# Patient Record
Sex: MALE | Race: WHITE | NOT HISPANIC OR LATINO | Employment: FULL TIME | URBAN - METROPOLITAN AREA
[De-identification: names, ages, dates, MRNs, and addresses within clinical notes are randomized per-mention and may not be internally consistent; named-entity substitution may affect disease eponyms.]

---

## 2023-06-18 ENCOUNTER — HOSPITAL ENCOUNTER (EMERGENCY)
Facility: OTHER | Age: 48
Discharge: HOME | End: 2023-06-18
Attending: EMERGENCY MEDICINE
Payer: COMMERCIAL

## 2023-06-18 VITALS
BODY MASS INDEX: 30.78 KG/M2 | HEIGHT: 70 IN | SYSTOLIC BLOOD PRESSURE: 130 MMHG | TEMPERATURE: 98 F | RESPIRATION RATE: 18 BRPM | HEART RATE: 76 BPM | WEIGHT: 215 LBS | DIASTOLIC BLOOD PRESSURE: 83 MMHG | OXYGEN SATURATION: 98 %

## 2023-06-18 DIAGNOSIS — T79.2XXA: Primary | ICD-10-CM
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
## 2023-06-18 DIAGNOSIS — R52 PAIN: ICD-10-CM

## 2023-06-18 LAB
ANION GAP SERPL CALC-SCNC: 13 MMOL/L (ref 8–16)
BASOPHILS # BLD AUTO: 0.03 K/UL (ref 0–0.2)
BASOPHILS NFR BLD: 0.4 % (ref 0–1.9)
BUN SERPL-MCNC: 18 MG/DL (ref 6–20)
CALCIUM SERPL-MCNC: 9.5 MG/DL (ref 8.7–10.5)
CHLORIDE SERPL-SCNC: 106 MMOL/L (ref 95–110)
CO2 SERPL-SCNC: 20 MMOL/L (ref 23–29)
CREAT SERPL-MCNC: 0.9 MG/DL (ref 0.5–1.4)
DIFFERENTIAL METHOD: NORMAL
EOSINOPHIL # BLD AUTO: 0.2 K/UL (ref 0–0.5)
EOSINOPHIL NFR BLD: 2.6 % (ref 0–8)
ERYTHROCYTE [DISTWIDTH] IN BLOOD BY AUTOMATED COUNT: 13.7 % (ref 11.5–14.5)
EST. GFR  (NO RACE VARIABLE): >60 ML/MIN/1.73 M^2
GLUCOSE SERPL-MCNC: 99 MG/DL (ref 70–110)
HCT VFR BLD AUTO: 41.7 % (ref 40–54)
HGB BLD-MCNC: 14.6 G/DL (ref 14–18)
IMM GRANULOCYTES # BLD AUTO: 0.03 K/UL (ref 0–0.04)
IMM GRANULOCYTES NFR BLD AUTO: 0.4 % (ref 0–0.5)
LYMPHOCYTES # BLD AUTO: 1.8 K/UL (ref 1–4.8)
LYMPHOCYTES NFR BLD: 24.7 % (ref 18–48)
MCH RBC QN AUTO: 30.4 PG (ref 27–31)
MCHC RBC AUTO-ENTMCNC: 35 G/DL (ref 32–36)
MCV RBC AUTO: 87 FL (ref 82–98)
MONOCYTES # BLD AUTO: 0.6 K/UL (ref 0.3–1)
MONOCYTES NFR BLD: 8.3 % (ref 4–15)
NEUTROPHILS # BLD AUTO: 4.7 K/UL (ref 1.8–7.7)
NEUTROPHILS NFR BLD: 63.6 % (ref 38–73)
NRBC BLD-RTO: 0 /100 WBC
PLATELET # BLD AUTO: 286 K/UL (ref 150–450)
PMV BLD AUTO: 9.3 FL (ref 9.2–12.9)
POTASSIUM SERPL-SCNC: 3.9 MMOL/L (ref 3.5–5.1)
RBC # BLD AUTO: 4.81 M/UL (ref 4.6–6.2)
SODIUM SERPL-SCNC: 139 MMOL/L (ref 136–145)
WBC # BLD AUTO: 7.34 K/UL (ref 3.9–12.7)

## 2023-06-18 PROCEDURE — 99284 EMERGENCY DEPT VISIT MOD MDM: CPT | Mod: 25

## 2023-06-18 PROCEDURE — 80048 BASIC METABOLIC PNL TOTAL CA: CPT | Performed by: NURSE PRACTITIONER

## 2023-06-18 PROCEDURE — 85025 COMPLETE CBC W/AUTO DIFF WBC: CPT | Performed by: NURSE PRACTITIONER

## 2023-06-18 PROCEDURE — 63600175 PHARM REV CODE 636 W HCPCS: Performed by: NURSE PRACTITIONER

## 2023-06-18 RX ORDER — HYDROCODONE BITARTRATE AND ACETAMINOPHEN 5; 325 MG/1; MG/1
1 TABLET ORAL 4 TIMES DAILY
Qty: 12 TABLET | Refills: 0 | Status: SHIPPED | OUTPATIENT
Start: 2023-06-18 | End: 2023-06-21

## 2023-06-18 RX ADMIN — SODIUM CHLORIDE, POTASSIUM CHLORIDE, SODIUM LACTATE AND CALCIUM CHLORIDE 1000 ML: 600; 310; 30; 20 INJECTION, SOLUTION INTRAVENOUS at 04:06

## 2023-06-18 NOTE — ED PROVIDER NOTES
"Source of History:  Patient     Chief complaint:  Hip Pain (Reports falling on steep/sharp stairs 2 weeks ago. Has been having L hip pain since. Reports a large contusion to L hip. Able to ambulate but reports pain. )      HPI:  Chris Aldridge is a 47 y.o. male presenting to the emergency department with complaint of left hip pain/bruising after slip and fall on some stairs 2 weeks ago.  Reports persistent swelling which concerned him and prompted him to present to emergency department.  He has no difficulty ambulating.      This is the extent to the patients complaints today here in the emergency department.    PMH:  As per HPI and below:  No past medical history on file.  No past surgical history on file.         Review of patient's allergies indicates:   Allergen Reactions    Bactrim [sulfamethoxazole-trimethoprim] Hives       ROS: As per HPI and below:  General: No fever.  No chills.  Eyes: No visual changes.   ENT: No sore throat. No ear pain.  Urinary: No abnormal urination.  MSK:  Left hip pain  Integument:  Bruising No rashes or lesions.       Physical Exam:    /83 (BP Location: Left arm, Patient Position: Sitting)   Pulse 76   Temp 98 °F (36.7 °C) (Oral)   Resp 18   Ht 5' 10" (1.778 m)   Wt 97.5 kg (215 lb)   SpO2 98%   BMI 30.85 kg/m²   Vitals:    06/18/23 1210 06/18/23 1623 06/18/23 1835 06/18/23 1902   BP: 136/86 (!) 143/98 127/86 130/83   Pulse: (!) 111  76 76   Resp: 18 18 18   Temp: 98 °F (36.7 °C)  98 °F (36.7 °C)    TempSrc: Oral  Oral    SpO2: 97%  96% 98%   Weight: 97.5 kg (215 lb)      Height: 5' 10" (1.778 m)          Nursing note and vital signs reviewed.  Appearance: No acute distress.  Eyes: No conjunctival injection.  Extraocular muscles are intact.  ENT: Normal phonation.  Cardio:  DP +2 bilaterally.  Musculoskeletal:  Full range motion of the left hip is noted.  There is a large baseball sized area of swelling to the lateral aspect which is mildly tender to palpation.  " There is no erythema or warmth  Skin:  No rashes seen.  Good turgor.  No abrasions.  Bruising  Mental Status:  Alert and oriented x 3.  Appropriate, conversant.    Initial MDM:  7-year-old male presents for evaluation of lateral left thigh/hip pain and swelling after fall 2 weeks ago.  On exam there is a baseball sized area of swelling seroma versus hematoma.  There is no surrounding erythema it is not warm to touch have a low concern for infectious or abscess at this time will obtain x-rays and Ultram    Labs Reviewed   BASIC METABOLIC PANEL - Abnormal; Notable for the following components:       Result Value    CO2 20 (*)     All other components within normal limits   CBC W/ AUTO DIFFERENTIAL       MRI Femur Without Contrast Left   Final Result      See above comments.  Recommend clinical correlation and follow-up.         Electronically signed by: Daren Coley   Date:    06/18/2023   Time:    18:23      US Extremity Non Vascular Complete Left   Final Result      Lateral thigh fluid collection.  Consider further evaluation with MRI femur.         Electronically signed by: Alex Ward MD   Date:    06/18/2023   Time:    15:52      X-Ray Hip 2 or 3 views Left (with Pelvis when performed)   Final Result      No evidence of fracture.No significant degenerative changes.         Electronically signed by: Cheryl Guardado MD   Date:    06/18/2023   Time:    14:35            Initial Impression/ Differential Dx:  Abscess, hematoma, seroma muscular injury    MDM:    47 y.o. male with left hip pain and swelling after fall 2 weeks ago.  On exam there was a large area of swelling, baseball size of the lateral aspect with mild tenderness.  X-rays were negative for any fractures.  Ultrasound revealed a fluid collection and MRI was recommended for further evaluation.  MRI showed a lateral thigh fluid collection concerning for a seroma versus hematoma.  I spoke with on-call General surgery who had no further recommendations if  there was no suspicion of abscess or infection.  Physical exam reveals no overlying erythema it is not warm to touch I have a low concern for infection at this time.  Impression short and follow-up with General surgery.  Will discharge home with pain medication.  I did discuss however he develops a fever, increasing pain, warmth or redness of the area he is to return to emergency department for re-evaluation.  He stated understanding         Diagnostic Impression:    1. Traumatic seroma of left thigh, initial encounter    2. Pain    3. Fall         ED Disposition Condition    Discharge Stable            ED Prescriptions       Medication Sig Dispense Start Date End Date Auth. Provider    HYDROcodone-acetaminophen (NORCO) 5-325 mg per tablet Take 1 tablet by mouth 4 (four) times daily. for 3 days 12 tablet 6/18/2023 6/21/2023 ANNABELLE Cordero          Follow-up Information       Follow up With Specialties Details Why Contact Info    [facility] Dept Emergency Medicine Go to  If symptoms worsen [address] [facility] [address]  [phone]    Sergio Jordan Jr., MD General Surgery, Vascular Surgery Schedule an appointment as soon as possible for a visit in 1 week  [address]  [address]  [place] [address]  [phone]                 ANNABELLE Cordero  06/18/23 3610

## 2023-06-18 NOTE — ED NOTES
Large swollen area to left hip since fall 2 weeks ago, yellowish coloring noted to edges of swelling

## 2023-06-26 ENCOUNTER — OFFICE VISIT (OUTPATIENT)
Dept: SURGERY | Facility: CLINIC | Age: 48
End: 2023-06-26
Attending: SPECIALIST
Payer: COMMERCIAL

## 2023-06-26 VITALS
SYSTOLIC BLOOD PRESSURE: 127 MMHG | HEART RATE: 91 BPM | DIASTOLIC BLOOD PRESSURE: 81 MMHG | BODY MASS INDEX: 31.35 KG/M2 | OXYGEN SATURATION: 96 % | HEIGHT: 70 IN | WEIGHT: 219 LBS

## 2023-06-26 DIAGNOSIS — S70.02XA HEMATOMA OF LEFT HIP, INITIAL ENCOUNTER: Primary | ICD-10-CM

## 2023-06-26 PROCEDURE — 3079F DIAST BP 80-89 MM HG: CPT | Mod: CPTII,S$GLB,, | Performed by: SPECIALIST

## 2023-06-26 PROCEDURE — 1159F PR MEDICATION LIST DOCUMENTED IN MEDICAL RECORD: ICD-10-PCS | Mod: CPTII,S$GLB,, | Performed by: SPECIALIST

## 2023-06-26 PROCEDURE — 99202 PR OFFICE/OUTPT VISIT, NEW, LEVL II, 15-29 MIN: ICD-10-PCS | Mod: S$GLB,,, | Performed by: SPECIALIST

## 2023-06-26 PROCEDURE — 3074F PR MOST RECENT SYSTOLIC BLOOD PRESSURE < 130 MM HG: ICD-10-PCS | Mod: CPTII,S$GLB,, | Performed by: SPECIALIST

## 2023-06-26 PROCEDURE — 3008F PR BODY MASS INDEX (BMI) DOCUMENTED: ICD-10-PCS | Mod: CPTII,S$GLB,, | Performed by: SPECIALIST

## 2023-06-26 PROCEDURE — 3079F PR MOST RECENT DIASTOLIC BLOOD PRESSURE 80-89 MM HG: ICD-10-PCS | Mod: CPTII,S$GLB,, | Performed by: SPECIALIST

## 2023-06-26 PROCEDURE — 3008F BODY MASS INDEX DOCD: CPT | Mod: CPTII,S$GLB,, | Performed by: SPECIALIST

## 2023-06-26 PROCEDURE — 3074F SYST BP LT 130 MM HG: CPT | Mod: CPTII,S$GLB,, | Performed by: SPECIALIST

## 2023-06-26 PROCEDURE — 99202 OFFICE O/P NEW SF 15 MIN: CPT | Mod: S$GLB,,, | Performed by: SPECIALIST

## 2023-06-26 PROCEDURE — 1160F PR REVIEW ALL MEDS BY PRESCRIBER/CLIN PHARMACIST DOCUMENTED: ICD-10-PCS | Mod: CPTII,S$GLB,, | Performed by: SPECIALIST

## 2023-06-26 PROCEDURE — 1159F MED LIST DOCD IN RCRD: CPT | Mod: CPTII,S$GLB,, | Performed by: SPECIALIST

## 2023-06-26 PROCEDURE — 99999 PR PBB SHADOW E&M-EST. PATIENT-LVL III: ICD-10-PCS | Mod: PBBFAC,,, | Performed by: SPECIALIST

## 2023-06-26 PROCEDURE — 1160F RVW MEDS BY RX/DR IN RCRD: CPT | Mod: CPTII,S$GLB,, | Performed by: SPECIALIST

## 2023-06-26 PROCEDURE — 99999 PR PBB SHADOW E&M-EST. PATIENT-LVL III: CPT | Mod: PBBFAC,,, | Performed by: SPECIALIST

## 2023-06-26 RX ORDER — DEXTROAMPHETAMINE SACCHARATE, AMPHETAMINE ASPARTATE, DEXTROAMPHETAMINE SULFATE AND AMPHETAMINE SULFATE 5; 5; 5; 5 MG/1; MG/1; MG/1; MG/1
1 TABLET ORAL 2 TIMES DAILY
COMMUNITY
Start: 2023-06-16

## 2023-06-26 RX ORDER — EMTRICITABINE AND TENOFOVIR DISOPROXIL FUMARATE 200; 300 MG/1; MG/1
1 TABLET, FILM COATED ORAL
COMMUNITY
Start: 2023-06-02

## 2023-06-26 RX ORDER — HYDROCHLOROTHIAZIDE 25 MG/1
25 TABLET ORAL
COMMUNITY
Start: 2023-06-15

## 2023-06-26 RX ORDER — TADALAFIL 20 MG/1
TABLET ORAL
COMMUNITY
Start: 2023-06-16

## 2023-06-26 NOTE — PROGRESS NOTES
47-year-old male referred from [facility] for evaluation of seroma/hematoma left hip   Patient fell approximately 3 weeks ago  Initially patient had significant ecchymoses which have since resolved  No issues with ROM left hip    PE   4 cm nontender soft spongy mass left hip  No evidence of infection    Impression/plan   Hematoma/seroma left hip   Lesion appears by history to be resolving   No compelling need for drainage, RTC p.r.n.

## 2023-07-03 ENCOUNTER — TELEPHONE (OUTPATIENT)
Dept: INTERNAL MEDICINE | Facility: CLINIC | Age: 48
End: 2023-07-03
Payer: COMMERCIAL

## 2023-07-03 NOTE — TELEPHONE ENCOUNTER
----- Message from Nicholas Feliz DO sent at 7/2/2023  9:36 PM CDT -----  Regarding: RE: Sooner Appt Request  Agree to virtual visit in extra slot (template should have these viewable)    ----- Message -----  From: Estephania Waters LPN  Sent: 6/30/2023   2:13 PM CDT  To: Nicholas Feliz DO  Subject: FW: Sooner Appt Request                          Would you like to see this pt before December as a new pt?  ----- Message -----  From: Carmela Fairchild  Sent: 6/28/2023   3:47 PM CDT  To: [facility] Staff  Subject: Sooner Appt Request                              Who Is Calling : MITRA SAMPSON [[id number]]        Reason For The Call: Patient is requesting a sooner appointment.  Patient declined first available and does not want to be added to the waitlist.  Please contact the patient to schedule.        Preferred Contact Method: [phone]        Additional Information: Patient is on PREP and needs to be seen every 3 months for labs. Patient has just moved to [place]. The first available date was 12/10 and patient will need to be seen before that

## 2023-07-03 NOTE — TELEPHONE ENCOUNTER
Given Virtual 07/26/2023 0845 with Dr. Feliz. Sent instructions on how to access Virtual visit as he states he has a computer

## 2024-08-13 NOTE — PROGRESS NOTES
dSubjective:     HPI: Chris Aldridge is a 48 y.o. male who was self-referred for post-nasal drip.    Patient reports developing a sinus infection 4 months ago.  He subsequently developed a postnasal drip sensation that has been waxing and waning.  He will feel thick mucus in his throat and feels like he has to sniffling in order to clear it.  If he does not he states it causes him to have a sensation of dysphagia.  He has not intermittent globus sensation and dysphagia but denies those symptoms today.  Symptoms worsened as the day goes on and patient works at home.  Symptoms recently significantly improved. Patient without symptoms of rhinitis other than postnasal drip sensation.  He has undergone allergy testing which only showed an elevated IgE level without any particular airborne allergens.  He has tried Flonase and Atrovent without significant improvement in symptoms.  He denies nasal obstruction or hyposmia.      Triggers for the cough include the following:   - voice use:  no  - breathing heavily:  no  - laughing:  no  - eating:  no  - drinking cold liquids:  no  - strong odors:  no    - post-prandial:  no  - lying down:  no    Taking an ARB/ACEI: yes    Prior workup includes the following:  - pulmonary:  no  - GI:  no  - allergy:  no  - ENT:  no    Current sinonasal medications as above.  The last course of antibiotics was a long time ago.    He recalls previously having allergy testing, which was reportedly all negative.  He denies a history of asthma. H/o bronchitis as child  He relates a history of reflux symptoms which is currently managed with prilosec (x1 week).    He denies a diagnosis of obstructive sleep apnea.   He does not recall a prior history of nasal trauma.  He has not had sinonasal surgery.    He has had a tonsillectomy&adenoidectomy.  He is not a tobacco smoker.     Past Medical/Past Surgical History  No past medical history on file.  He has no past surgical history on file.    Family  History/Social History  His family history is not on file.  He reports that he has quit smoking. His smoking use included cigarettes. He has never been exposed to tobacco smoke. He has never used smokeless tobacco.    Allergies/Immunizations  He is allergic to bactrim [sulfamethoxazole-trimethoprim].    There is no immunization history on file for this patient.     Medications   dextroamphetamine-amphetamine  emtricitabine-tenofovir 200-300 mg Tab  hydroCHLOROthiazide  tadalafiL Tab     Review of Systems     Constitutional: Negative for appetite change, chills, fatigue, fever and unexpected weight loss.      HENT: Positive for ear pain, nosebleeds, postnasal drip, sinus infection, sinus pressure, sore throat, trouble swallowing and voice change.      Eyes:  Negative for change in eyesight, eye drainage, eye itching and photophobia.     Respiratory:  Positive for shortness of breath.      Cardiovascular:  Negative for chest pain, foot swelling, irregular heartbeat and swollen veins.     Gastrointestinal:  Positive for acid reflux and heartburn. Negative for abdominal pain, constipation, diarrhea and vomiting.     Genitourinary: Negative for difficulty urinating, sexual problems and frequent urination.     Musc: Positive for aching muscles and back pain.     Skin: Negative for rash.     Allergy: Positive for seasonal allergies. Negative for food allergies.     Endocrine: Negative for cold intolerance and heat intolerance.      Neurological: Positive for headaches and light-headedness.     Hematologic: Positive for swollen glands. Negative for bruises/bleeds easily.      Psychiatric: Positive for decreased concentration and sleep disturbance. Negative for depression and nervous/anxious.            Objective:     BP (!) 133/90 (BP Location: Right arm, Patient Position: Sitting)   Pulse 78   Wt 106.2 kg (234 lb 2.1 oz)   BMI 33.59 kg/m²      Physical Exam  Vitals reviewed.   Constitutional:       Appearance: Normal  appearance.   HENT:      Head: Normocephalic and atraumatic.      Right Ear: Tympanic membrane, ear canal and external ear normal.      Left Ear: Tympanic membrane, ear canal and external ear normal.      Nose: No septal deviation, mucosal edema or rhinorrhea.      Right Nostril: No epistaxis.      Left Nostril: No epistaxis.      Right Turbinates: Not enlarged.      Left Turbinates: Not enlarged.      Right Sinus: No maxillary sinus tenderness or frontal sinus tenderness.      Left Sinus: No maxillary sinus tenderness or frontal sinus tenderness.      Comments: Sniffling on exam without evidence of rhinorrhea     Mouth/Throat:      Lips: Pink.      Mouth: Mucous membranes are moist.      Dentition: Normal dentition.      Tongue: No lesions. Tongue does not deviate from midline.      Palate: No mass and lesions.      Pharynx: Oropharynx is clear. Uvula midline. No uvula swelling.      Tonsils: No tonsillar exudate or tonsillar abscesses. 0 on the right. 0 on the left.   Eyes:      Extraocular Movements: Extraocular movements intact.      Conjunctiva/sclera: Conjunctivae normal.   Neck:      Thyroid: No thyromegaly or thyroid tenderness.   Musculoskeletal:      Cervical back: No tenderness.   Lymphadenopathy:      Cervical: No cervical adenopathy.   Neurological:      Mental Status: He is alert.   Psychiatric:         Mood and Affect: Mood normal.         Behavior: Behavior normal.         Procedure    Flexible laryngoscopy performed.  See procedure note.     L NV     L ET with secretions and dried crusting on NP wall     R NV; mild septal deviation     R MT     R ET     Hypopharynx/larynx     VF mobility intact      Data Reviewed  I personally reviewed the chart, including any outside records, and pertinent data below:    I reviewed the following notes Internal Medicine     WBC (K/uL)   Date Value   06/18/2023 7.34     Eosinophil % (%)   Date Value   06/18/2023 2.6     Eos # (K/uL)   Date Value   06/18/2023 0.2      Platelets (K/uL)   Date Value   06/18/2023 286     Glucose (mg/dL)   Date Value   06/18/2023 99     Total IgE 518 (5/31/24)    No sinus imaging available.    Assessment & Plan:     1. Chronic throat clearing  2. Chronic sniffling  3. Laryngopharyngeal reflux (LPR)  -    suspect LPR and xerostomia agitating his nasopharynx and larynx, contributing to his globus sensation and dysphagia.  I have started patient on PPI, but if symptoms are not improved will have patient undergo swallow study.  - Saline rinse as flexible laryngoscopy with evidence of dry mucous on nasopharyngeal wall; no significant rhinitis on exam  Discussed the etiology of LPR and management strategies including nonpharmacologic treatments: eating smaller meals, eating at least 3 hours before bed, elevation of the head of bed at night, avoidance of caffeine, chocolate, nicotine and peppermint, and avoiding tight fitting clothing.    - omeprazole (PRILOSEC) 20 MG capsule; Take 1 capsule (20 mg total) by mouth once daily.  Dispense: 90 capsule; Refill: 3  -     Fl Modified Barium Swallow Speech; Future; Expected date: 08/14/2024  -     SLP video swallow; Future; Expected date: 08/14/2024  -     Laryngoscopy    4. Dysphagia, unspecified type  -     Fl Modified Barium Swallow Speech; Future; Expected date: 08/14/2024  -     SLP video swallow; Future; Expected date: 08/14/2024    5. Muscle tension dysphonia   - likely from sniffling  6. Abnormal antibody titer   - managed by allergist Dr. Lizy Elizalde; completed PVX; unclear if numbers improved    He will follow up in 2 months  I had a discussion with the patient regarding his condition and the further workup and management options.    All questions were answered, and the patient is in agreement with the above.     Disclaimer:  This note may have been prepared utilizing voice recognition software which may result in occasional typographical errors in the text such as sound alike words.   If further  clarification is needed, please contact the ENT department of [facility].

## 2024-08-14 ENCOUNTER — OFFICE VISIT (OUTPATIENT)
Dept: OTOLARYNGOLOGY | Facility: CLINIC | Age: 49
End: 2024-08-14
Payer: COMMERCIAL

## 2024-08-14 VITALS
WEIGHT: 234.13 LBS | HEART RATE: 78 BPM | SYSTOLIC BLOOD PRESSURE: 133 MMHG | DIASTOLIC BLOOD PRESSURE: 90 MMHG | BODY MASS INDEX: 33.59 KG/M2

## 2024-08-14 DIAGNOSIS — K21.9 LARYNGOPHARYNGEAL REFLUX (LPR): ICD-10-CM

## 2024-08-14 DIAGNOSIS — R09.89 CHRONIC THROAT CLEARING: Primary | ICD-10-CM

## 2024-08-14 DIAGNOSIS — R49.0 MUSCLE TENSION DYSPHONIA: ICD-10-CM

## 2024-08-14 DIAGNOSIS — R13.10 DYSPHAGIA, UNSPECIFIED TYPE: ICD-10-CM

## 2024-08-14 DIAGNOSIS — R09.89 CHRONIC SNIFFLING: ICD-10-CM

## 2024-08-14 PROCEDURE — 3008F BODY MASS INDEX DOCD: CPT | Mod: CPTII,S$GLB,, | Performed by: PHYSICIAN ASSISTANT

## 2024-08-14 PROCEDURE — 3080F DIAST BP >= 90 MM HG: CPT | Mod: CPTII,S$GLB,, | Performed by: PHYSICIAN ASSISTANT

## 2024-08-14 PROCEDURE — 99204 OFFICE O/P NEW MOD 45 MIN: CPT | Mod: 25,S$GLB,, | Performed by: PHYSICIAN ASSISTANT

## 2024-08-14 PROCEDURE — 3075F SYST BP GE 130 - 139MM HG: CPT | Mod: CPTII,S$GLB,, | Performed by: PHYSICIAN ASSISTANT

## 2024-08-14 PROCEDURE — 31575 DIAGNOSTIC LARYNGOSCOPY: CPT | Mod: S$GLB,,, | Performed by: PHYSICIAN ASSISTANT

## 2024-08-14 PROCEDURE — 4010F ACE/ARB THERAPY RXD/TAKEN: CPT | Mod: CPTII,S$GLB,, | Performed by: PHYSICIAN ASSISTANT

## 2024-08-14 PROCEDURE — 1159F MED LIST DOCD IN RCRD: CPT | Mod: CPTII,S$GLB,, | Performed by: PHYSICIAN ASSISTANT

## 2024-08-14 PROCEDURE — 99999 PR PBB SHADOW E&M-EST. PATIENT-LVL IV: CPT | Mod: PBBFAC,,, | Performed by: PHYSICIAN ASSISTANT

## 2024-08-14 RX ORDER — CETIRIZINE HYDROCHLORIDE 10 MG/1
10 TABLET ORAL DAILY
COMMUNITY

## 2024-08-14 RX ORDER — OMEPRAZOLE 20 MG/1
20 CAPSULE, DELAYED RELEASE ORAL DAILY
Qty: 90 CAPSULE | Refills: 3 | Status: SHIPPED | OUTPATIENT
Start: 2024-08-14 | End: 2025-08-14

## 2024-08-14 RX ORDER — IPRATROPIUM BROMIDE 21 UG/1
SPRAY, METERED NASAL
COMMUNITY
Start: 2024-07-31

## 2024-08-14 RX ORDER — EMTRICITABINE AND TENOFOVIR ALAFENAMIDE 200; 25 MG/1; MG/1
1 TABLET ORAL
COMMUNITY
Start: 2024-07-31

## 2024-08-14 RX ORDER — FLUTICASONE PROPIONATE 50 MCG
1 SPRAY, SUSPENSION (ML) NASAL DAILY
COMMUNITY

## 2024-08-14 RX ORDER — LOSARTAN POTASSIUM 50 MG/1
50 TABLET ORAL
COMMUNITY
Start: 2024-07-31

## 2024-08-14 NOTE — PATIENT INSTRUCTIONS
"Please contact central scheduling at [phone] or [phone] to schedule your swallow study if not better after one month.     NeilMed Sinus rinse   Try purchasing this nasal rinse below.  Its over the counter and can use several times daily without any side effects, but please use at least 1-2 times daily.  Use it before applying your nasal spray medications.  This rinse can help wash away mucus and crusting and allow for better absorption of the medications.  Please use the nasal saline wash about 15 minutes before applying your medicated nasal sprays. This bottle uses distilled water (NOT tap water).  The Instructions in the box are detailed so please read them before using.          LARYNGOPHARYNGEAL REFLUX  (SILENT OR ATYPICAL REFLUX)    LPR is a very challenging disease as it includes a vast range of symptoms and difficult to diagnose.      SYMPTOMS  If you have any of the following symptoms you MAY have laryngopharyngeal reflux (LPR):    1. Hoarseness  2. Sore throat  4. throat clearing, sometimes clearing thick mucous  5. chronic cough, especially cough that wakes you up from sleep  6.  "postnasal drip" without the need to blow your nose  7. Globus sensation, like the sensation of something being stuck in the throat  8.  Red, swollen or irritated larynx (voice box)    HOW  Many people with LPR do not have symptoms of heartburn. Compared to the esophagus, the voice box and the back of the throat are significantly more sensitive to the effects of acid and digestive enzymes on surrounding tissue. Acid passing quickly through the esophagus does not have a chance to irritate the area for too long.  However acid that pools in the throat or voice box can cause prolonged irritation resulting in the symptoms of LPR.    DIAGNOSIS  A common procedure performed by an ENT is called flexible laryngoscopy.  A thin tube is inserted through the nose in order to visualize the larynx (the voice box). This method can " detect abnormalities in the voice box ranging from polyps to laryngeal cancer.  This can also reveal signs of LPR, but is not 100% reliable.      If symptoms persist despite medical treatment listed below, further testing is needed.  Three commonly used tests are: a swallowing study; a direct look at the stomach and esophagus through an endoscope, and; an esophageal pH test.  Further testing is usually coordinated with a gastroenterologist.     TREATMENT  Lifestyle changes  1. Do not smoke.  Smoking will worsen reflux.    2. Avoid eating at least 2-3 hours prior to bedtime.  Try to avoid very large meals at night.    4. Weight loss.  For patient's with recent weight gain, shedding a few pounds is all that is required to improve reflux.    5. Avoid reflux triggers. These can worsen acid in the stomach or even cause the esophagus muscles to relax: caffeine, soft drinks, acidic foods (tomato, lots of fruit) mints, alcoholic beverages, particularly at night, cheese, fried foods, spicy foods, eggs, and chocolate.    6. Sleep with the head of bed elevated at least 6 inches.    Consider reading the book Dropping Acid by Chepe Lizarraga MD.  This has information to help choose meals with less acid.     Other Treatments:  All natural remedies include Reflux Gourmet (and Reflux Raft) which create a temporary protective barrier in your stomach to prevent reflux into your esophagus. This can be an effective therapy without the side effects of normal acid reducing medications and was also developed by laryngologists (LPR specialists).   Speech Therapy: education and techniques aimed at helping you control the cough     Medications  Take over-the-counter (OTC) medications, including antacids, such as Gaviscon Advance (others include Tums®, Maalox®, or Mylanta) as needed  Prescription and OTC stomach acid reducers: H2 blockers such as famotidine (Pepcid®), cimetidine (Tagamet®), ranitidine (Zantac®) OR proton pump inhibitors  (PPIs), such as omeprazole (Prilosec®), pantoprazole (Protonix®), and esomeprazole (Nexium®).  Most patients will begin to notice some relief in their symptoms about 2-4 weeks after starting an acid reducing medication; however it is generally recommended the medication should be continued for at least 2 months. If the symptoms completely resolve, the medication can then be tapered.  Some people will remain symptom free while others may have relapses which required treatment again.

## 2024-08-14 NOTE — PROCEDURES
"Laryngoscopy    Date/Time: 8/14/2024 1:30 PM    Performed by: Milind Gonzalez PA-C  Authorized by: Milind Gonzalez PA-C    Time out: Immediately prior to procedure a "time out" was called to verify the correct patient, procedure, equipment, support staff and site/side marked as required.    Anesthesia:     Local anesthetic:  Lidocaine 4% and Franc-Synephrine 1/2%    Patient tolerance:  Patient tolerated the procedure well with no immediate complications  Laryngoscopy:     Areas examined:  Nasal cavities, nasopharynx, oropharynx, hypopharynx, larynx and vocal cords    Laryngoscope size:  4 mm  Nose External:      No external nasal deformity  Nose Intranasal:      Mucosa no polyps     Mucosa ulcers not present     No mucosa lesions present     No septum gross deformity     Enlarged turbinates  Nasopharynx:      No mucosa lesions     Adenoids present     Posterior choanae patent     Eustachian tube patent  Larynx/hypopharynx:      No epiglottis lesions     No epiglottis edema     No AE folds lesions     No vocal cord polyps     Equal and normal bilateral     No hypopharynx lesions     No piriform sinus pooling     No piriform sinus lesions     Post cricoid edema     No post cricoid erythema     Dry, adhered secretions/crusting to posterior NP wall; aerated secretions mild   Mild/scant clear mucus from L posterior IT        "

## 2024-08-22 ENCOUNTER — TELEPHONE (OUTPATIENT)
Dept: SPEECH THERAPY | Facility: HOSPITAL | Age: 49
End: 2024-08-22
Payer: COMMERCIAL

## 2024-10-14 ENCOUNTER — OFFICE VISIT (OUTPATIENT)
Dept: OTOLARYNGOLOGY | Facility: CLINIC | Age: 49
End: 2024-10-14
Payer: COMMERCIAL

## 2024-10-14 ENCOUNTER — TELEPHONE (OUTPATIENT)
Dept: GASTROENTEROLOGY | Facility: CLINIC | Age: 49
End: 2024-10-14
Payer: COMMERCIAL

## 2024-10-14 VITALS
SYSTOLIC BLOOD PRESSURE: 115 MMHG | BODY MASS INDEX: 32.67 KG/M2 | HEART RATE: 87 BPM | WEIGHT: 228.19 LBS | HEIGHT: 70 IN | DIASTOLIC BLOOD PRESSURE: 78 MMHG

## 2024-10-14 DIAGNOSIS — K21.9 LARYNGOPHARYNGEAL REFLUX (LPR): ICD-10-CM

## 2024-10-14 DIAGNOSIS — R49.0 MUSCLE TENSION DYSPHONIA: ICD-10-CM

## 2024-10-14 DIAGNOSIS — R13.10 DYSPHAGIA, UNSPECIFIED TYPE: ICD-10-CM

## 2024-10-14 DIAGNOSIS — R09.89 CHRONIC THROAT CLEARING: Primary | ICD-10-CM

## 2024-10-14 DIAGNOSIS — R09.89 CHRONIC SNIFFLING: ICD-10-CM

## 2024-10-14 PROCEDURE — 3078F DIAST BP <80 MM HG: CPT | Mod: CPTII,S$GLB,, | Performed by: PHYSICIAN ASSISTANT

## 2024-10-14 PROCEDURE — 99214 OFFICE O/P EST MOD 30 MIN: CPT | Mod: S$GLB,,, | Performed by: PHYSICIAN ASSISTANT

## 2024-10-14 PROCEDURE — 3008F BODY MASS INDEX DOCD: CPT | Mod: CPTII,S$GLB,, | Performed by: PHYSICIAN ASSISTANT

## 2024-10-14 PROCEDURE — 99999 PR PBB SHADOW E&M-EST. PATIENT-LVL IV: CPT | Mod: PBBFAC,,, | Performed by: PHYSICIAN ASSISTANT

## 2024-10-14 PROCEDURE — 3074F SYST BP LT 130 MM HG: CPT | Mod: CPTII,S$GLB,, | Performed by: PHYSICIAN ASSISTANT

## 2024-10-14 PROCEDURE — 4010F ACE/ARB THERAPY RXD/TAKEN: CPT | Mod: CPTII,S$GLB,, | Performed by: PHYSICIAN ASSISTANT

## 2024-10-14 PROCEDURE — 1159F MED LIST DOCD IN RCRD: CPT | Mod: CPTII,S$GLB,, | Performed by: PHYSICIAN ASSISTANT

## 2024-10-14 RX ORDER — LOSARTAN POTASSIUM 50 MG/1
TABLET ORAL
COMMUNITY

## 2024-10-14 RX ORDER — ZOLPIDEM TARTRATE 10 MG/1
TABLET ORAL
COMMUNITY

## 2024-10-14 RX ORDER — DOXYCYCLINE 100 MG/1
CAPSULE ORAL
COMMUNITY

## 2024-10-14 RX ORDER — EMTRICITABINE AND TENOFOVIR ALAFENAMIDE 200; 25 MG/1; MG/1
TABLET ORAL
COMMUNITY
Start: 2024-01-22

## 2024-10-14 RX ORDER — AZELASTINE HYDROCHLORIDE, FLUTICASONE PROPIONATE 137; 50 UG/1; UG/1
SPRAY, METERED NASAL
COMMUNITY

## 2024-10-14 RX ORDER — TADALAFIL 20 MG/1
TABLET ORAL
COMMUNITY
Start: 2024-01-22 | End: 2024-10-14

## 2024-10-14 RX ORDER — VALACYCLOVIR HYDROCHLORIDE 1 G/1
TABLET, FILM COATED ORAL
COMMUNITY
Start: 2024-09-06 | End: 2024-11-04

## 2024-10-14 RX ORDER — DEXTROAMPHETAMINE SULFATE, DEXTROAMPHETAMINE SACCHARATE, AMPHETAMINE SULFATE AND AMPHETAMINE ASPARTATE 7.5; 7.5; 7.5; 7.5 MG/1; MG/1; MG/1; MG/1
CAPSULE, EXTENDED RELEASE ORAL
COMMUNITY
Start: 2024-06-06

## 2024-10-14 NOTE — TELEPHONE ENCOUNTER
----- Message from Med Assistant French sent at 10/14/2024 12:01 PM CDT -----  Regarding: appointment  Referral has been placed in chart. Please can someone call to schedule appt.

## 2024-10-14 NOTE — PROGRESS NOTES
Subjective:      Chris is a 49 y.o. male who comes for follow-up of  postnasal drip .  His last visit with me was on 8/14/2024.      Patient has been compliant with saline rinse BID and daily omeprazole which has improved his symptoms dramatically.  He still has sniffling and postnasal drip which both appear to be more prevalent after eating.  No more sneezing or rhinorrhea than normal.  Sniffling was not present prior to developing this sinus infection about 6 months ago.    Per last office visit 8/14/2024 :  Patient reports developing a sinus infection 4 months ago.  He subsequently developed a postnasal drip sensation that has been waxing and waning.  He will feel thick mucus in his throat and feels like he has to sniffling in order to clear it.  If he does not he states it causes him to have a sensation of dysphagia.  He has intermittent globus sensation and dysphagia but denies those symptoms today.  Symptoms worsened as the day goes on and patient works at home.  Symptoms recently significantly improved. Patient without symptoms of rhinitis other than postnasal drip sensation.  He has undergone allergy testing which only showed an elevated IgE level without any particular airborne allergens.  He has tried Flonase and Atrovent without significant improvement in symptoms.  He denies nasal obstruction or hyposmia.       Triggers for the cough include the following:   - No to the following: voice use, breathing heavily, laughing, eating, drinking cold liquids, strong odors, post-prandial, lying down     Taking an ARB/ACEI: yes     Prior workup includes the following:  - pulmonary:  no  - GI:  no  - allergy:  no  - ENT:  no     Current sinonasal medications as above.  The last course of antibiotics was a long time ago.    He recalls previously having allergy testing, which was reportedly all negative.  He denies a history of asthma. H/o bronchitis as child  He relates a history of reflux symptoms which is  "currently managed with prilosec (x1 week).    He denies a diagnosis of obstructive sleep apnea.   He does not recall a prior history of nasal trauma.  He has not had sinonasal surgery.    He has had a tonsillectomy&adenoidectomy.  He is not a tobacco smoker.     1. Chronic throat clearing  2. Chronic sniffling  3. Laryngopharyngeal reflux (LPR)  -    suspect LPR and xerostomia agitating his nasopharynx and larynx, contributing to his globus sensation and dysphagia.  I have started patient on PPI, but if symptoms are not improved will have patient undergo swallow study.  -Saline rinse as flexible laryngoscopy with evidence of dry mucous on nasopharyngeal wall; no significant rhinitis on exam  Discussed the etiology of LPR and management strategies including nonpharmacologic treatments: eating smaller meals, eating at least 3 hours before bed, elevation of the head of bed at night, avoidance of caffeine, chocolate, nicotine and peppermint, and avoiding tight fitting clothing.    - omeprazole (PRILOSEC) 20 MG capsule; Take 1 capsule (20 mg total) by mouth once daily.  Dispense: 90 capsule; Refill: 3  -     Fl Modified Barium Swallow Speech; Future; Expected date: 08/14/2024  -     SLP video swallow; Future; Expected date: 08/14/2024  -     Laryngoscopy     4. Dysphagia, unspecified type  -     Fl Modified Barium Swallow Speech; Future; Expected date: 08/14/2024  -     SLP video swallow; Future; Expected date: 08/14/2024     5. Muscle tension dysphonia              - likely from sniffling  6. Abnormal antibody titer              - managed by allergist Dr. Lizy Elizalde; completed PVX; unclear if numbers improved      The patient's medications, allergies, past medical, surgical, social and family histories were reviewed and updated as appropriate.    A detailed review of systems was obtained with pertinent positives as per the above HPI, and otherwise negative.        Objective:     /78   Pulse 87   Ht 5' 10" " "(1.778 m)   Wt 103.5 kg (228 lb 2.8 oz)   BMI 32.74 kg/m²      Physical Exam  Vitals reviewed.   Constitutional:       Appearance: Normal appearance.   HENT:      Head: Normocephalic and atraumatic.      Right Ear: External ear normal.      Left Ear: External ear normal.      Nose: No mucosal edema or rhinorrhea.      Right Turbinates: Enlarged.      Left Turbinates: Not enlarged, swollen or pale.      Comments: R IT 2+     Mouth/Throat:      Lips: No lesions.      Mouth: Mucous membranes are moist.   Eyes:      Conjunctiva/sclera: Conjunctivae normal.   Pulmonary:      Effort: Pulmonary effort is normal.   Neurological:      Mental Status: He is alert.        Procedure    None    Data Reviewed    WBC (K/uL)   Date Value   06/18/2023 7.34     Eosinophil % (%)   Date Value   06/18/2023 2.6     Eos # (K/uL)   Date Value   06/18/2023 0.2     Platelets (K/uL)   Date Value   06/18/2023 286     Glucose (mg/dL)   Date Value   06/18/2023 99     No results found for: "IGE"    Assessment:     1. Chronic throat clearing    2. Chronic sniffling    3. Laryngopharyngeal reflux (LPR)    4. Muscle tension dysphonia    5. Dysphagia, unspecified type         Plan:     IMPROVING  Continue saline rinse and nasal sprays  GI referral placed  Trial atrovent after eating    He will follow up pending above treatment  I had a discussion with the patient regarding his condition and the further workup and management options.    All questions were answered, and the patient is in agreement with the above.     Disclaimer:  This note may have been prepared utilizing voice recognition software which may result in occasional typographical errors in the text such as sound alike words.   If further clarification is needed, please contact the ENT department of [facility].    "

## 2024-12-03 ENCOUNTER — OFFICE VISIT (OUTPATIENT)
Dept: GASTROENTEROLOGY | Facility: CLINIC | Age: 49
End: 2024-12-03
Payer: COMMERCIAL

## 2024-12-03 VITALS
HEART RATE: 80 BPM | HEIGHT: 70 IN | BODY MASS INDEX: 32.92 KG/M2 | SYSTOLIC BLOOD PRESSURE: 125 MMHG | WEIGHT: 229.94 LBS | DIASTOLIC BLOOD PRESSURE: 83 MMHG

## 2024-12-03 DIAGNOSIS — R09.89 CHRONIC THROAT CLEARING: ICD-10-CM

## 2024-12-03 DIAGNOSIS — R13.10 DYSPHAGIA, UNSPECIFIED TYPE: ICD-10-CM

## 2024-12-03 DIAGNOSIS — K21.9 LARYNGOPHARYNGEAL REFLUX (LPR): Primary | ICD-10-CM

## 2024-12-03 PROCEDURE — 99204 OFFICE O/P NEW MOD 45 MIN: CPT | Mod: S$GLB,,,

## 2024-12-03 PROCEDURE — 99999 PR PBB SHADOW E&M-EST. PATIENT-LVL IV: CPT | Mod: PBBFAC,,,

## 2024-12-03 PROCEDURE — 3074F SYST BP LT 130 MM HG: CPT | Mod: CPTII,S$GLB,,

## 2024-12-03 PROCEDURE — 4010F ACE/ARB THERAPY RXD/TAKEN: CPT | Mod: CPTII,S$GLB,,

## 2024-12-03 PROCEDURE — 3008F BODY MASS INDEX DOCD: CPT | Mod: CPTII,S$GLB,,

## 2024-12-03 PROCEDURE — 1159F MED LIST DOCD IN RCRD: CPT | Mod: CPTII,S$GLB,,

## 2024-12-03 PROCEDURE — 3079F DIAST BP 80-89 MM HG: CPT | Mod: CPTII,S$GLB,,

## 2024-12-03 RX ORDER — OMEPRAZOLE 40 MG/1
40 CAPSULE, DELAYED RELEASE ORAL DAILY
Qty: 90 CAPSULE | Refills: 3 | Status: SHIPPED | OUTPATIENT
Start: 2024-12-03 | End: 2025-12-03

## 2024-12-03 RX ORDER — SEMAGLUTIDE 0.5 MG/.5ML
INJECTION, SOLUTION SUBCUTANEOUS
COMMUNITY
Start: 2024-11-13

## 2024-12-03 NOTE — PROGRESS NOTES
Gastroenterology Clinic Consultation Note    Reason for Visit:  The primary encounter diagnosis was Laryngopharyngeal reflux (LPR). Diagnoses of Dysphagia, unspecified type and Chronic throat clearing were also pertinent to this visit.    PCP:   Maria C, Primary Doctor   [address] [facility] / [place] [id number]    Initial HPI   This is a 49 y.o. male presenting for suspected LPR. Patient is new to me. Referred by ENT for ongoing issues of chronic throat clearing with dysphagia. Symptoms began following a sinus infection earlier this year (April) that resulted in lingering postnasal drip. As a result, he has noticed difficulty with throat clearing due to mucous buildup that ultimately causes him sensation of dysphagia. Says this often occurs when eating. Denies food feeling slow to go down. He describes as increased mucous production while eating. Resorts to snorting maneuver for clearance of this. Says symptoms worsen throughout the day. Says he rarely experiences issues of reflux with water brash. Patient was prescribed saline rinse BID and daily omeprazole which seemed to improve his symptoms somewhat. Patient suspected most of these issues were allergy driven however allergy testing performed in August have been unrevealing thus far. Notices symptoms worsen during certain months noting the months of July-August being the most troublesome. [place] Was out of town for 3 weeks in [place] and can not recall if symptoms improved during this time. Patient is suspicious his symptoms are more allergy driven as he often works outside.     Patient recently underwent EGD/Colonoscopy in November. Colonoscopy was normal with recommended repeat in 10 years. EGD showed some erythema in the antrum and stomach with negative biopsies.     Denies any odynophagia, nausea, vomiting, heartburn or acid regurgitation. No abdominal pains, changes in bowel pattern, blood/ mucus in stool or unintentional weight loss.  Nocturnal symptoms. No melena or maroon stools. No recent changes in diet or medications. No family history of IBD, Celiac disease or GI malignancy. No regular NSAIDs. No alcohol or tobacco use. No recent antibiotic use, travels or sick contacts. No prior history of C.diff.    ENT visit on 10/14/2024  3. Laryngopharyngeal reflux (LPR)  -    suspect LPR and xerostomia agitating his nasopharynx and larynx, contributing to his globus sensation and dysphagia.  I have started patient on PPI, but if symptoms are not improved will have patient undergo swallow study.  -Saline rinse as flexible laryngoscopy with evidence of dry mucous on nasopharyngeal wall; no significant rhinitis on exam  Discussed the etiology of LPR and management strategies including nonpharmacologic treatments: eating smaller meals, eating at least 3 hours before bed, elevation of the head of bed at night, avoidance of caffeine, chocolate, nicotine and peppermint, and avoiding tight fitting clothing.    - omeprazole (PRILOSEC) 20 MG capsule; Take 1 capsule (20 mg total) by mouth once daily.  Dispense: 90 capsule; Refill: 3  -     Fl Modified Barium Swallow Speech; Future; Expected date: 08/14/2024  -     SLP video swallow; Future; Expected date: 08/14/2024  -     Laryngoscopy     4. Dysphagia, unspecified type  -     Fl Modified Barium Swallow Speech; Future; Expected date: 08/14/2024  -     SLP video swallow; Future; Expected date: 08/14/2024    Abdominal Surgeries: None    ROS:  Review of Systems   Constitutional:  Negative for chills, fever, malaise/fatigue and weight loss.   HENT:  Positive for congestion.    Respiratory:  Positive for sputum production. Negative for cough, hemoptysis, shortness of breath and wheezing.    Cardiovascular:  Negative for chest pain, palpitations, orthopnea, claudication, leg swelling and PND.   Gastrointestinal:  Negative for abdominal pain, blood in stool, constipation, diarrhea, heartburn, melena, nausea and  vomiting.   Genitourinary:  Negative for dysuria, flank pain, frequency, hematuria and urgency.   Musculoskeletal:  Negative for back pain, falls, joint pain, myalgias and neck pain.   Skin:  Negative for itching and rash.   Neurological:  Negative for dizziness, seizures, loss of consciousness, weakness and headaches.   Psychiatric/Behavioral:  The patient is not nervous/anxious and does not have insomnia.       Medical History:  has no past medical history on file.    Surgical History:  has no past surgical history on file.    Family History: family history is not on file..     Review of patient's allergies indicates:   Allergen Reactions    Bactrim [sulfamethoxazole-trimethoprim] Hives     Current Outpatient Medications on File Prior to Visit   Medication Sig Dispense Refill    ADDERALL XR 30 mg 24 hr capsule 1 capsule in the morning Orally Once a day for 30 days      azelastine-fluticasone (DYMISTA) 137-50 mcg/spray Spry nassal spray instill ONE SPRAY IN EACH NOSTRIL TWICE DAILY for 30      cetirizine (ZYRTEC) 10 MG tablet Take 10 mg by mouth once daily.      DESCOVY 200-25 mg Tab Take 1 tablet by mouth.      doxycycline (VIBRAMYCIN) 100 MG Cap TAKE TWO CAPSULES BY MOUTH WITHIN 72 HOURS OF UNPROTECTED SEX for 30 days      emtricitabine-tenofovir alafen (DESCOVY) 200-25 mg Tab 1 tablet Orally Once a day for 90 days      fluticasone propionate (FLONASE) 50 mcg/actuation nasal spray 1 spray by Each Nostril route once daily.      ipratropium (ATROVENT) 21 mcg (0.03 %) nasal spray SPRAY TWO SPRAYS IN EACH NOSTRIL UP TO THREE TIMES DAILY AS NEEDED      losartan (COZAAR) 50 MG tablet Take 50 mg by mouth.      losartan (COZAAR) 50 MG tablet 1 tablet Orally Once a day for 90 days      tadalafiL (CIALIS) 20 MG Tab Take by mouth.      WEGOVY 0.5 mg/0.5 mL PnIj inject 0.5ml SUBCUTANEOUSLY EVERY DAY      zolpidem (AMBIEN) 10 mg Tab TAKE ONE TABLET BY MOUTH AT BEDTIME AS NEEDED Oral for 30 Days      [DISCONTINUED] omeprazole  "(PRILOSEC) 20 MG capsule Take 1 capsule (20 mg total) by mouth once daily. 90 capsule 3    valACYclovir (VALTREX) 1000 MG tablet 1 tablet Orally Once a day for 30 days       No current facility-administered medications on file prior to visit.     Objective Findings:    Vital Signs:  /83 (BP Location: Right arm, Patient Position: Sitting)   Pulse 80   Ht 5' 10" (1.778 m)   Wt 104.3 kg (229 lb 15 oz)   BMI 32.99 kg/m²   Body mass index is 32.99 kg/m².    Physical Exam  Constitutional:       Appearance: Normal appearance. He is obese.   HENT:      Head: Normocephalic and atraumatic.      Mouth/Throat:      Mouth: Mucous membranes are moist.      Pharynx: Oropharynx is clear.   Cardiovascular:      Rate and Rhythm: Normal rate and regular rhythm.      Pulses: Normal pulses.      Heart sounds: Normal heart sounds.   Pulmonary:      Effort: Pulmonary effort is normal.      Breath sounds: Normal breath sounds.   Abdominal:      General: Abdomen is flat. Bowel sounds are normal.      Palpations: Abdomen is soft.   Musculoskeletal:         General: Normal range of motion.      Cervical back: Normal range of motion and neck supple.   Skin:     General: Skin is warm and dry.   Neurological:      General: No focal deficit present.      Mental Status: He is alert and oriented to person, place, and time. Mental status is at baseline.   Psychiatric:         Mood and Affect: Mood normal.         Behavior: Behavior normal.         Thought Content: Thought content normal.         Judgment: Judgment normal.       Labs:  Lab Results   Component Value Date    WBC 7.34 06/18/2023    HGB 14.6 06/18/2023    HCT 41.7 06/18/2023     06/18/2023     06/18/2023    K 3.9 06/18/2023     06/18/2023    CREATININE 0.9 06/18/2023    BUN 18 06/18/2023    CO2 20 (L) 06/18/2023     Imaging reviewed:   No recent/relevant GI imaging performed for review.     Endoscopy reviewed:   Laryngoscopy: 8/14/2024    Areas examined:  " Nasal cavities, nasopharynx, oropharynx, hypopharynx,   larynx and vocal cords     Laryngoscope size:  4 mm   Nose External:      No external nasal deformity   Nose Intranasal:      Mucosa no polyps      Mucosa ulcers not present      No mucosa lesions present      No septum gross deformity      Enlarged turbinates   Nasopharynx:      No mucosa lesions      Adenoids present      Posterior choanae patent      Eustachian tube patent   Larynx/hypopharynx:      No epiglottis lesions      No epiglottis edema      No AE folds lesions      No vocal cord polyps      Equal and normal bilateral      No hypopharynx lesions      No piriform sinus pooling      No piriform sinus lesions      Post cricoid edema      No post cricoid erythema      Dry, adhered secretions/crusting to posterior NP wall; aerated   secretions mild   Mild/scant clear mucus from L posterior IT     Colonoscopy performed at [facility] this November 2024- normal colon, no biopsies collected. Recommended repeat in 10 years.  EGD performed with this noting erythema in the antrum and stomach.   Records scanned to media and returned to patient.     Assessment:  1. Laryngopharyngeal reflux (LPR)    2. Dysphagia, unspecified type    3. Chronic throat clearing      Orders Placed This Encounter    omeprazole (PRILOSEC) 40 MG capsule     Plan:  Increase omeprazole to 40 mg daily 30-45 minutes prior to breakfast.   2. Discussed plan with ENT referring provider. Will proceed with barium swallow study given ongoing dysphagia.   3. F/u with GI in 3-6 months.   4. Repeat screening colonoscopy 2034       Thank you for allowing me to participate in this patient's care.    Sincerely,     AMARILIS LANE  Gastroenterology Department  [facility]

## 2025-01-17 ENCOUNTER — PATIENT MESSAGE (OUTPATIENT)
Dept: GASTROENTEROLOGY | Facility: CLINIC | Age: 50
End: 2025-01-17
Payer: COMMERCIAL

## 2025-01-27 ENCOUNTER — PATIENT MESSAGE (OUTPATIENT)
Dept: GASTROENTEROLOGY | Facility: CLINIC | Age: 50
End: 2025-01-27
Payer: COMMERCIAL

## 2025-03-03 ENCOUNTER — OFFICE VISIT (OUTPATIENT)
Dept: GASTROENTEROLOGY | Facility: CLINIC | Age: 50
End: 2025-03-03
Payer: COMMERCIAL

## 2025-03-03 ENCOUNTER — PATIENT MESSAGE (OUTPATIENT)
Dept: GASTROENTEROLOGY | Facility: CLINIC | Age: 50
End: 2025-03-03

## 2025-03-03 DIAGNOSIS — K21.9 LARYNGOPHARYNGEAL REFLUX (LPR): Primary | ICD-10-CM

## 2025-03-03 RX ORDER — SILDENAFIL CITRATE 100 MG/1
100 TABLET, FILM COATED ORAL
COMMUNITY
Start: 2025-02-05

## 2025-03-03 RX ORDER — CEFDINIR 300 MG/1
300 CAPSULE ORAL 2 TIMES DAILY
COMMUNITY
Start: 2025-02-27

## 2025-03-03 RX ORDER — TESTOSTERONE 20.25 MG/1.25G
GEL TOPICAL
COMMUNITY
Start: 2025-02-27

## 2025-03-03 RX ORDER — SEMAGLUTIDE 2.4 MG/.75ML
INJECTION, SOLUTION SUBCUTANEOUS
COMMUNITY
Start: 2025-02-20

## 2025-03-03 RX ORDER — CALCIUM CARBONATE/VITAMIN D3 600MG-5MCG
TABLET ORAL
COMMUNITY

## 2025-03-03 RX ORDER — FAMOTIDINE 40 MG/1
40 TABLET, FILM COATED ORAL 2 TIMES DAILY PRN
Qty: 30 TABLET | Refills: 11 | Status: SHIPPED | OUTPATIENT
Start: 2025-03-03 | End: 2026-03-03

## 2025-03-03 NOTE — PROGRESS NOTES
The patient location is: Home   The chief complaint leading to consultation is: LPR    Visit type: audiovisual    Face to Face time with patient: 10 minutes  20 minutes of total time spent on the encounter, which includes face to face time and non-face to face time preparing to see the patient (eg, review of tests), Obtaining and/or reviewing separately obtained history, Documenting clinical information in the electronic or other health record, Independently interpreting results (not separately reported) and communicating results to the patient/family/caregiver, or Care coordination (not separately reported).     Each patient to whom he or she provides medical services by telemedicine is:  (1) informed of the relationship between the physician and patient and the respective role of any other health care provider with respect to management of the patient; and (2) notified that he or she may decline to receive medical services by telemedicine and may withdraw from such care at any time.                                                                                 Gastroenterology Progress Note    Reason for Visit:  The encounter diagnosis was Laryngopharyngeal reflux (LPR).    PCP:   No, Primary Doctor         Initial HPI   This is a 49 y.o. male presenting for GI f/up for patients LPR. Saw JOSÉ Cunningham NP on 12/3/2024 and recommended increasing Prilosec to 40mg once daily. He reports that he has not noticed any improvement in his symptoms. He reports that he will get increased mucous production after eating. Does not matter what he eats. He reports getting stuffy in his nose. Unable to blow his nose.  But feeling sinus congestion. Has been following with and allergist as well as ENT. Has no immunity to pneumococcal so has been following with allergist to improve this as this can be contributing. Denies globus sensation, but will feel like he has excessive mucous in his throat.     Patient states that his symptoms  started after moving from another state and his symptoms returned. Triggers include extensive home courtyard exposure. Symptoms have gotten really severe 2 times after spending a lot of time in his back courtyard area.     Patient recently underwent EGD/Colonoscopy in November. Colonoscopy was normal with recommended repeat in 10 years. EGD showed some erythema in the antrum and stomach with negative biopsies.     ROS:  Review of Systems   Constitutional:  Negative for chills, fever, malaise/fatigue and weight loss.   HENT:  Positive for congestion and sinus pain. Negative for sore throat.    Eyes:  Negative for redness.   Respiratory:  Positive for sputum production.    Gastrointestinal:  Negative for abdominal pain, blood in stool, constipation, diarrhea, heartburn, melena, nausea and vomiting.   Skin:  Negative for rash.   Neurological:  Negative for dizziness, loss of consciousness and weakness.        Medical History:  has no past medical history on file.    Surgical History:  has no past surgical history on file.    Family History: family history is not on file..       Review of patient's allergies indicates:   Allergen Reactions    Bactrim [sulfamethoxazole-trimethoprim] Hives       Medications Ordered Prior to Encounter[1]      Objective Findings:    Vital Signs:  There were no vitals taken for this visit.  There is no height or weight on file to calculate BMI.    Physical Exam:  Physical Exam        Labs:  Lab Results   Component Value Date    WBC 7.34 06/18/2023    HGB 14.6 06/18/2023    HCT 41.7 06/18/2023     06/18/2023     06/18/2023    K 3.9 06/18/2023     06/18/2023    CREATININE 0.9 06/18/2023    BUN 18 06/18/2023    CO2 20 (L) 06/18/2023         Imaging reviewed: No pertinent imaging reviewed      Endoscopy reviewed: Laryngoscopy 8/14/2024  Laryngoscopy:    Areas examined:  Nasal cavities, nasopharynx, oropharynx, hypopharynx,  larynx and vocal cords    Laryngoscope size:  4  mm  Nose External:     No external nasal deformity  Nose Intranasal:     Mucosa no polyps     Mucosa ulcers not present     No mucosa lesions present     No septum gross deformity     Enlarged turbinates  Nasopharynx:     No mucosa lesions     Adenoids present     Posterior choanae patent     Eustachian tube patent  Larynx/hypopharynx:     No epiglottis lesions     No epiglottis edema     No AE folds lesions     No vocal cord polyps     Equal and normal bilateral     No hypopharynx lesions     No piriform sinus pooling     No piriform sinus lesions     Post cricoid edema     No post cricoid erythema     Dry, adhered secretions/crusting to posterior NP wall; aerated  secretions mild  Mild/scant clear mucus from L posterior IT      Last Resulted: 08/14/24 13:30 CDT       Assessment:  1. Laryngopharyngeal reflux (LPR)             Recommendations:  Repeat EGD with Bravo capsule OFF OF PPI for 96 hours to determine if reflux is triggering his symptoms. Pepcid nightly.       Thank you for allowing me to participate in this patient's care.    Sincerely,     Vivien Jorgensen NP  Gastroenterology Department  [facility]            [1]   Current Outpatient Medications on File Prior to Visit   Medication Sig Dispense Refill    ADDERALL XR 30 mg 24 hr capsule 1 capsule in the morning Orally Once a day for 30 days      azelastine-fluticasone (DYMISTA) 137-50 mcg/spray Spry nassal spray instill ONE SPRAY IN EACH NOSTRIL TWICE DAILY for 30      cetirizine (ZYRTEC) 10 MG tablet Take 10 mg by mouth once daily.      DESCOVY 200-25 mg Tab Take 1 tablet by mouth.      doxycycline (VIBRAMYCIN) 100 MG Cap TAKE TWO CAPSULES BY MOUTH WITHIN 72 HOURS OF UNPROTECTED SEX for 30 days      emtricitabine-tenofovir alafen (DESCOVY) 200-25 mg Tab 1 tablet Orally Once a day for 90 days      fluticasone propionate (FLONASE) 50 mcg/actuation nasal spray 1 spray by Each Nostril route once daily.      ipratropium (ATROVENT) 21 mcg (0.03 %) nasal  spray SPRAY TWO SPRAYS IN EACH NOSTRIL UP TO THREE TIMES DAILY AS NEEDED      losartan (COZAAR) 50 MG tablet Take 50 mg by mouth.      losartan (COZAAR) 50 MG tablet 1 tablet Orally Once a day for 90 days      omeprazole (PRILOSEC) 40 MG capsule Take 1 capsule (40 mg total) by mouth once daily. 90 capsule 3    tadalafiL (CIALIS) 20 MG Tab Take by mouth.      valACYclovir (VALTREX) 1000 MG tablet 1 tablet Orally Once a day for 30 days      WEGOVY 0.5 mg/0.5 mL PnIj inject 0.5ml SUBCUTANEOUSLY EVERY DAY      zolpidem (AMBIEN) 10 mg Tab TAKE ONE TABLET BY MOUTH AT BEDTIME AS NEEDED Oral for 30 Days       No current facility-administered medications on file prior to visit.

## 2025-03-03 NOTE — PROGRESS NOTES
GENERAL GI PATIENT INTAKE:    COVID symptoms in the last 7 days (runny nose, sore throat, congestion, cough, fever): No  PCP: Alvin Moon  If not PCP-  number given to establish [phone]: No    ALLERGIES REVIEWED:  Yes    CHIEF COMPLAINT:    Chief Complaint   Patient presents with    Gastroesophageal Reflux       VITAL SIGNS:  There were no vitals taken for this visit.     Change in medical, surgical, family or social history:  reviewed by NP      REVIEWED MEDICATION LIST RECONCILED INCLUDING ABOVE MEDS:  Yes

## 2025-03-20 ENCOUNTER — TELEPHONE (OUTPATIENT)
Dept: ENDOSCOPY | Facility: HOSPITAL | Age: 50
End: 2025-03-20
Payer: COMMERCIAL

## 2025-03-20 NOTE — TELEPHONE ENCOUNTER
"Contacted the patient to schedule an endoscopy procedure(s) EGD with Bryson. The patient was not able to talk at this time.     ----- Message -----   From: Vivien Jorgensen NP   Sent: 3/3/2025  11:16 AM CST   To: [facility]   Subject: EGD w/Bravo                                      Procedure: EGD w/Bravo OFF PPI for 96 hours     Diagnosis: GERD and Dysphagia     Procedure Timin-12 weeks     *If within 4 weeks selected, please jessica as high priority*     *If greater than 12 weeks, please select "5-12 weeks" and delay sending until 3 months prior to requested date*     Location: Any Site     Additional Scheduling Information: No scheduling concerns     Prep Specifications:N/A     Is the patient taking a GLP-1 Agonist:no     Have you attached a patient to this message: yes   "

## 2025-03-26 ENCOUNTER — TELEPHONE (OUTPATIENT)
Dept: ENDOSCOPY | Facility: HOSPITAL | Age: 50
End: 2025-03-26
Payer: COMMERCIAL

## 2025-03-26 ENCOUNTER — OFFICE VISIT (OUTPATIENT)
Dept: OTOLARYNGOLOGY | Facility: CLINIC | Age: 50
End: 2025-03-26
Payer: COMMERCIAL

## 2025-03-26 VITALS — SYSTOLIC BLOOD PRESSURE: 131 MMHG | DIASTOLIC BLOOD PRESSURE: 83 MMHG | HEART RATE: 84 BPM

## 2025-03-26 DIAGNOSIS — R09.89 CHRONIC SNIFFLING: ICD-10-CM

## 2025-03-26 DIAGNOSIS — R09.82 POST-NASAL DRIP: ICD-10-CM

## 2025-03-26 DIAGNOSIS — K21.9 LARYNGOPHARYNGEAL REFLUX (LPR): ICD-10-CM

## 2025-03-26 DIAGNOSIS — J30.1 SEASONAL ALLERGIC RHINITIS DUE TO POLLEN: ICD-10-CM

## 2025-03-26 DIAGNOSIS — R09.89 CHRONIC THROAT CLEARING: Primary | ICD-10-CM

## 2025-03-26 PROCEDURE — 99999 PR PBB SHADOW E&M-EST. PATIENT-LVL IV: CPT | Mod: PBBFAC,,, | Performed by: OTOLARYNGOLOGY

## 2025-03-26 RX ORDER — MOMETASONE FUROATE MONOHYDRATE 50 UG/1
2 SPRAY, METERED NASAL DAILY
Qty: 17 EACH | Refills: 0 | Status: SHIPPED | OUTPATIENT
Start: 2025-03-26 | End: 2025-04-25

## 2025-03-26 NOTE — TELEPHONE ENCOUNTER
"Spoke to pt about scheduling procedure. Pt states after visit with Dr. Singh today he was advised to hold off on procedure for a few months until after some allergy testing and to see if his symptoms improve or not.   Provided dept number for pt to call and schedule if he chooses to proceed with procedure.     From: Vivien Jorgensen NP   Sent: 3/3/2025  11:16 AM CST   To: [facility]   Subject: EGD w/Bravo                                      Procedure: EGD w/Bravo OFF PPI for 96 hours     Diagnosis: GERD and Dysphagia     Procedure Timin-12 weeks     *If within 4 weeks selected, please jessica as high priority*     *If greater than 12 weeks, please select "5-12 weeks" and delay sending until 3 months prior to requested date*     Location: Any Site     Additional Scheduling Information: No scheduling concerns     Prep Specifications:N/A     Is the patient taking a GLP-1 Agonist:no     Have you attached a patient to this message: yes   "

## 2025-03-26 NOTE — PROGRESS NOTES
"49 year old male who presents by self referral for evaluation of sinus symptoms.  About a year ago he had a sinus infection that required medications to treat it.  Since then he has been having frequent and intermittent "sinus responses. These "sinus responses" include onset of nasal congestion and post nasal drip with sneezing c/w allergy response. This often occurs when he eats. It is not associated with any particular food but whenever he eats. He also has had these allergy type responses w/ exposures to cleaning/ working in [employer] [place]. He grew up [place] and admits to allergy responses to baling hay. He can no longer help with hay when he returns home but can help with straw.  He has spent time in [place], [place]  - has had some improvement while there but then symptoms return here. Symptoms are worse during eating as a day goes one. He has tried antihistamines in the past and did not feel they help but now that he is off antihistamine for allergy testing he is so itchy that he does think they were helping. He has been given Flonase, Astelin & Dymista in the past. He did not feel it helped a lot but would get some bloody irritation in his nose. However, he never used the topical nasal steroid daily for maintenance of his symptoms. He would use only when episodes flared up. Does not think he has tried Singulair. No prior dx of asthma. Did get some mild benefit from ipratropium bromide for congestion and drainage.     Sees allergist, Lizy Elizalde - had allergy testing after IgE level over 500.  Some response to Grass Mix, Lambs Quarter, Roger, Russian Thistle, Pecan Pollen, Rabbit, and cocklebur  Has been off allergy meds and scheduled for testing at allergist today.     Had low levels to Strep pneumo immunoglobulins - had Pneumovax - had mild improvement of some levels - considering Prevnar next.     Has seen GI with [facility]. Used to be on Prilosec. Did not think it helped much. Saw Dr. Le most " recently - had upper & lower endos - not concerning findngs. Swallow study suggestion in past. Other GI recommended probe to wear for assessment of reflux. Feels mucus in throat. Clears throat often. This started after the sinus infection last year. No sinus pressure. Occasionally notes sour mucus/ reflux sensation in throat.  Sniffs nose repetitively too. Questions why he does this now.     PMHx, PSHx, Meds, Allergies, SocHx, FamHx reviewed in EPIC    Review of Systems     Constitutional: Positive for fatigue.  Negative for fever.      HENT: Positive for nosebleeds, postnasal drip, sinus infection, sinus pressure and trouble swallowing.  Negative for ear discharge, ear pain, hearing loss, runny nose, stuffy nose and voice change.      Eyes:  Negative for change in eyesight, eye drainage, eye itching and photophobia.     Respiratory:  Negative for cough, shortness of breath, sleep apnea, snoring and wheezing.      Cardiovascular:  Negative for chest pain, foot swelling, irregular heartbeat and swollen veins.     Gastrointestinal:  Positive for acid reflux. Negative for abdominal pain and heartburn.     Genitourinary: Negative for difficulty urinating, sexual problems and frequent urination.     Musc: Negative for aching joints, aching muscles, back pain and neck pain.     Skin: Negative for rash.     Allergy: Positive for food allergies and seasonal allergies.     Endocrine: Negative for cold intolerance and heat intolerance.      Neurological: Negative for dizziness, headaches, light-headedness, seizures and tremors.      Hematologic: Negative for bruises/bleeds easily and swollen glands.      Psychiatric: Positive for decreased concentration and sleep disturbance. Negative for depression and nervous/anxious.        PE: /83 (Patient Position: Sitting)   Pulse 84    Gen: male, well nourished, well developed, NAD, cooperative, good historian  Voice: decent volume w/o stridor, clears throat repetitively during  visit  Ears:  EAC patent & TM translucent with normal bony landmarks bilaterally  Nose: external nose broad bridge, nasal septum undulations with deviation to left, inferior turbinates mild to mod edema, no visible purulence or polyps  OC/OP:  MMM, tongue protrudes midline, palate raises symmetrically, tonsils absent, no erythema or exudate  Neck: supple, no TTP, no LAD or masses  Face:  no TTP, no erythema or flushing  Respiratory: Breathing comfortably without retractions  Skin: facial skin intact without visible lesions or flushing  Lymph: no neck lympadenopathy  Neuro:  facial movement symmetric, speech fluid, gait stable, tongue protrudes midline  Psych: alert & oriented x 3, reasonable, normal affect    Procedure: Flexible laryngoscopy  In order to fully examine the upper aerodigestive tract, including the larynx and posterior tongue in a patient with a gag reflex, flexible endoscopy required.  After explaining the procedure and obtaining verbal consent, both nasal cavities were anesthetized with 4% Xylocaine spray & Phenylephrine. The flexible laryngoscope (#[id number]) was inserted into the nasal cavity and advanced to visualize the nasal cavity, nasopharynx, the posterior oropharynx, hypopharynx, and the endolarynx with the findings noted below. The scope was removed and the procedure terminated. The patient tolerated this procedure well without apparent complication.      FINDINGS  Nasopharynx - the torus is clear. There are no lesions of the posterior wall. Scar bands c/w scarring from prior adenoidectomy at superior posterior choana, ET orifice wnl bilaterally  Oropharynx - no lesions of the tongue base. There is no obvious fullness or asymmetry.  Hypopharynx -  AE folds clear, posterior wall with cobblestoning, lateral walls clear, without any lesions.  Larynx - Bilateral vocal cords move symmetrically, interarytenoid thickening and arytenoid edema, no vocal cord lesions    ANTERIOR Nasal Cavity -  Right    Posterior nasal cavity right    Right ET orifice and NP with clear secretions (mucus versus spray)    Left anterior nasal cavity (NSD)    Left posterior nasal cavity choana some clear secretions (mucus versus spray)    Larynx during phonation - thickened arytenoid mucosa    Larynx during breath - posterior cobblestoning, interarytenoid edema, no pooling of secretions          Impression:   1. Chronic throat clearing        2. Chronic sniffling        3. Laryngopharyngeal reflux (LPR)        4. Seasonal allergic rhinitis due to pollen  mometasone (NASONEX) 50 mcg/actuation nasal spray      5. Post-nasal drip            Discussion and Plan:    Reviewed history, symptoms, exam findings, and endoscopy findings.    Recommend daily use of Nasonex (mometasone) 2 sprays each nostril daily. (Use instead of Flonase - as may be tolerated better.) We discussed how to apply it appropriately by placing the spray nozzle inside the nostril pointing above the ear on the same side and gently breathing in through the nose (do NOT snort). Prescription sent to pharmacy. Explained how daily use may control symptoms better as this spray is less likely to help when used intermittently.    Follow up with allergist for further allergy testing an consideration of allergy shots for treatment.    Consider asking allergist whether she feels Singulair (montelukast) is worth trying.      Proper vocal hygiene techniques were discussed including minimizing throat clearing, gentle clean cough only as needed, and avoiding yelling or whispering. Stay hydrated. Take voice breaks.  Being aware of sniffing and throat clearing - try to avoid. If mucus is felt in throat, perform a dry swallow or sip a small amount of water.    Monitor possible LPR symptoms. If the above options are not yielding improvement in symptoms and/or if reflux of gastric contents in throat recurs often then consider further testing for reflux with  gastroenterology.    Follow up here in 2-3 months to re-assess.           Parts or all of this note were created by voice recognition software; typographical errors in translating may be present.

## 2025-03-26 NOTE — PATIENT INSTRUCTIONS
Reviewed history, symptoms, exam findings, and endoscopy findings.    Recommend daily use of Nasonex (mometasone) 2 sprays each nostril daily. We discussed how to apply it appropriately by placing the spray nozzle inside the nostril pointing above the ear on the same side and gently breathing in through the nose (do NOT snort). Prescription sent to pharmacy. Explained how daily use may control symptoms better as this spray is less likely to help when used intermittently.    Follow up with allergist for further allergy testing an consideration of allergy shots for treatment.    Consider asking allergist whether she feels Singulair (montelukast) is worth trying.      Proper vocal hygiene techniques were discussed including minimizing throat clearing, gentle clean cough only as needed, and avoiding yelling or whispering. Stay hydrated. Take voice breaks.  Being aware of sniffing and throat clearing - try to avoid. If mucus is felt in throat, perform a dry swallow or sip a small amount of water.    Monitor possible LPR symptoms. If the above options are not yielding improvement in symptoms and/or if reflux of gastric contents in throat recurs often then consider further testing for reflux with gastroenterology.    Follow up here in 2-3 months to re-assess.